# Patient Record
Sex: MALE | Race: OTHER | HISPANIC OR LATINO | URBAN - METROPOLITAN AREA
[De-identification: names, ages, dates, MRNs, and addresses within clinical notes are randomized per-mention and may not be internally consistent; named-entity substitution may affect disease eponyms.]

---

## 2024-04-08 ENCOUNTER — EMERGENCY (EMERGENCY)
Facility: HOSPITAL | Age: 49
LOS: 1 days | Discharge: ANOTHER HOSPITAL | End: 2024-04-08
Attending: EMERGENCY MEDICINE
Payer: COMMERCIAL

## 2024-04-08 VITALS
OXYGEN SATURATION: 97 % | RESPIRATION RATE: 18 BRPM | HEIGHT: 66.93 IN | HEART RATE: 83 BPM | TEMPERATURE: 99 F | DIASTOLIC BLOOD PRESSURE: 91 MMHG | SYSTOLIC BLOOD PRESSURE: 138 MMHG | WEIGHT: 205.03 LBS

## 2024-04-08 PROCEDURE — 99285 EMERGENCY DEPT VISIT HI MDM: CPT | Mod: 25

## 2024-04-08 PROCEDURE — 12011 RPR F/E/E/N/L/M 2.5 CM/<: CPT

## 2024-04-08 PROCEDURE — 70450 CT HEAD/BRAIN W/O DYE: CPT | Mod: 26,MC

## 2024-04-08 PROCEDURE — 70486 CT MAXILLOFACIAL W/O DYE: CPT | Mod: 26,MC

## 2024-04-08 RX ORDER — ACETAMINOPHEN 500 MG
650 TABLET ORAL ONCE
Refills: 0 | Status: COMPLETED | OUTPATIENT
Start: 2024-04-08 | End: 2024-04-08

## 2024-04-08 NOTE — ED ADULT TRIAGE NOTE - CHIEF COMPLAINT QUOTE
Pt stated he tripped and fell hitting is head and nose on the sidewalk, this occurred 1 1/2 hour ago, denies loc or being on blood thinners, laceration noted to forehead and abrasion noted to nose

## 2024-04-09 ENCOUNTER — EMERGENCY (EMERGENCY)
Facility: HOSPITAL | Age: 49
LOS: 1 days | Discharge: HOME | End: 2024-04-09
Attending: STUDENT IN AN ORGANIZED HEALTH CARE EDUCATION/TRAINING PROGRAM
Payer: COMMERCIAL

## 2024-04-09 VITALS
RESPIRATION RATE: 12 BRPM | OXYGEN SATURATION: 100 % | DIASTOLIC BLOOD PRESSURE: 76 MMHG | TEMPERATURE: 99 F | SYSTOLIC BLOOD PRESSURE: 107 MMHG | HEART RATE: 78 BPM

## 2024-04-09 VITALS
RESPIRATION RATE: 14 BRPM | SYSTOLIC BLOOD PRESSURE: 131 MMHG | DIASTOLIC BLOOD PRESSURE: 87 MMHG | HEART RATE: 78 BPM | TEMPERATURE: 97 F | OXYGEN SATURATION: 100 %

## 2024-04-09 LAB
ALBUMIN SERPL ELPH-MCNC: 4.2 G/DL — SIGNIFICANT CHANGE UP (ref 3.3–5)
ALP SERPL-CCNC: 71 U/L — SIGNIFICANT CHANGE UP (ref 40–120)
ALT FLD-CCNC: 25 U/L — SIGNIFICANT CHANGE UP (ref 10–45)
ANION GAP SERPL CALC-SCNC: 13 MMOL/L — SIGNIFICANT CHANGE UP (ref 5–17)
ANION GAP SERPL CALC-SCNC: 6 MMOL/L — SIGNIFICANT CHANGE UP (ref 5–17)
APPEARANCE UR: ABNORMAL
APTT BLD: 30.7 SEC — SIGNIFICANT CHANGE UP (ref 24.5–35.6)
APTT BLD: 31.5 SEC — SIGNIFICANT CHANGE UP (ref 24.5–35.6)
AST SERPL-CCNC: 22 U/L — SIGNIFICANT CHANGE UP (ref 10–40)
BACTERIA # UR AUTO: NEGATIVE /HPF — SIGNIFICANT CHANGE UP
BASOPHILS # BLD AUTO: 0.03 K/UL — SIGNIFICANT CHANGE UP (ref 0–0.2)
BASOPHILS # BLD AUTO: 0.05 K/UL — SIGNIFICANT CHANGE UP (ref 0–0.2)
BASOPHILS NFR BLD AUTO: 0.3 % — SIGNIFICANT CHANGE UP (ref 0–2)
BASOPHILS NFR BLD AUTO: 0.4 % — SIGNIFICANT CHANGE UP (ref 0–2)
BILIRUB SERPL-MCNC: 0.2 MG/DL — SIGNIFICANT CHANGE UP (ref 0.2–1.2)
BILIRUB UR-MCNC: NEGATIVE — SIGNIFICANT CHANGE UP
BUN SERPL-MCNC: 19 MG/DL — SIGNIFICANT CHANGE UP (ref 7–23)
BUN SERPL-MCNC: 20 MG/DL — HIGH (ref 7–18)
CALCIUM SERPL-MCNC: 8.7 MG/DL — SIGNIFICANT CHANGE UP (ref 8.4–10.5)
CALCIUM SERPL-MCNC: 8.8 MG/DL — SIGNIFICANT CHANGE UP (ref 8.4–10.5)
CAST: 0 /LPF — SIGNIFICANT CHANGE UP (ref 0–4)
CHLORIDE SERPL-SCNC: 108 MMOL/L — SIGNIFICANT CHANGE UP (ref 96–108)
CHLORIDE SERPL-SCNC: 110 MMOL/L — HIGH (ref 96–108)
CO2 SERPL-SCNC: 22 MMOL/L — SIGNIFICANT CHANGE UP (ref 22–31)
CO2 SERPL-SCNC: 26 MMOL/L — SIGNIFICANT CHANGE UP (ref 22–31)
COLOR SPEC: YELLOW — SIGNIFICANT CHANGE UP
CREAT SERPL-MCNC: 1.02 MG/DL — SIGNIFICANT CHANGE UP (ref 0.5–1.3)
CREAT SERPL-MCNC: 1.03 MG/DL — SIGNIFICANT CHANGE UP (ref 0.5–1.3)
DIFF PNL FLD: NEGATIVE — SIGNIFICANT CHANGE UP
EGFR: 88 ML/MIN/1.73M2 — SIGNIFICANT CHANGE UP
EGFR: 89 ML/MIN/1.73M2 — SIGNIFICANT CHANGE UP
EOSINOPHIL # BLD AUTO: 0.51 K/UL — HIGH (ref 0–0.5)
EOSINOPHIL # BLD AUTO: 0.57 K/UL — HIGH (ref 0–0.5)
EOSINOPHIL NFR BLD AUTO: 4.6 % — SIGNIFICANT CHANGE UP (ref 0–6)
EOSINOPHIL NFR BLD AUTO: 4.9 % — SIGNIFICANT CHANGE UP (ref 0–6)
ETHANOL SERPL-MCNC: <10 MG/DL — SIGNIFICANT CHANGE UP (ref 0–10)
GLUCOSE SERPL-MCNC: 120 MG/DL — HIGH (ref 70–99)
GLUCOSE SERPL-MCNC: 98 MG/DL — SIGNIFICANT CHANGE UP (ref 70–99)
GLUCOSE UR QL: NEGATIVE MG/DL — SIGNIFICANT CHANGE UP
HCT VFR BLD CALC: 40.9 % — SIGNIFICANT CHANGE UP (ref 39–50)
HCT VFR BLD CALC: 41.1 % — SIGNIFICANT CHANGE UP (ref 39–50)
HGB BLD-MCNC: 13.2 G/DL — SIGNIFICANT CHANGE UP (ref 13–17)
HGB BLD-MCNC: 13.3 G/DL — SIGNIFICANT CHANGE UP (ref 13–17)
IMM GRANULOCYTES NFR BLD AUTO: 0.4 % — SIGNIFICANT CHANGE UP (ref 0–0.9)
IMM GRANULOCYTES NFR BLD AUTO: 0.4 % — SIGNIFICANT CHANGE UP (ref 0–0.9)
INR BLD: 1 RATIO — SIGNIFICANT CHANGE UP (ref 0.85–1.18)
INR BLD: 1.06 RATIO — SIGNIFICANT CHANGE UP (ref 0.85–1.18)
KETONES UR-MCNC: NEGATIVE MG/DL — SIGNIFICANT CHANGE UP
LACTATE BLDV-MCNC: 1.1 MMOL/L — SIGNIFICANT CHANGE UP (ref 0.5–2)
LEUKOCYTE ESTERASE UR-ACNC: NEGATIVE — SIGNIFICANT CHANGE UP
LYMPHOCYTES # BLD AUTO: 2.83 K/UL — SIGNIFICANT CHANGE UP (ref 1–3.3)
LYMPHOCYTES # BLD AUTO: 25.4 % — SIGNIFICANT CHANGE UP (ref 13–44)
LYMPHOCYTES # BLD AUTO: 26 % — SIGNIFICANT CHANGE UP (ref 13–44)
LYMPHOCYTES # BLD AUTO: 3.01 K/UL — SIGNIFICANT CHANGE UP (ref 1–3.3)
MCHC RBC-ENTMCNC: 29.6 PG — SIGNIFICANT CHANGE UP (ref 27–34)
MCHC RBC-ENTMCNC: 29.7 PG — SIGNIFICANT CHANGE UP (ref 27–34)
MCHC RBC-ENTMCNC: 32.1 GM/DL — SIGNIFICANT CHANGE UP (ref 32–36)
MCHC RBC-ENTMCNC: 32.5 GM/DL — SIGNIFICANT CHANGE UP (ref 32–36)
MCV RBC AUTO: 90.9 FL — SIGNIFICANT CHANGE UP (ref 80–100)
MCV RBC AUTO: 92.4 FL — SIGNIFICANT CHANGE UP (ref 80–100)
MONOCYTES # BLD AUTO: 0.98 K/UL — HIGH (ref 0–0.9)
MONOCYTES # BLD AUTO: 1.24 K/UL — HIGH (ref 0–0.9)
MONOCYTES NFR BLD AUTO: 10.7 % — SIGNIFICANT CHANGE UP (ref 2–14)
MONOCYTES NFR BLD AUTO: 8.8 % — SIGNIFICANT CHANGE UP (ref 2–14)
NEUTROPHILS # BLD AUTO: 6.66 K/UL — SIGNIFICANT CHANGE UP (ref 1.8–7.4)
NEUTROPHILS # BLD AUTO: 6.75 K/UL — SIGNIFICANT CHANGE UP (ref 1.8–7.4)
NEUTROPHILS NFR BLD AUTO: 57.7 % — SIGNIFICANT CHANGE UP (ref 43–77)
NEUTROPHILS NFR BLD AUTO: 60.4 % — SIGNIFICANT CHANGE UP (ref 43–77)
NITRITE UR-MCNC: NEGATIVE — SIGNIFICANT CHANGE UP
NRBC # BLD: 0 /100 WBCS — SIGNIFICANT CHANGE UP (ref 0–0)
NRBC # BLD: 0 /100 WBCS — SIGNIFICANT CHANGE UP (ref 0–0)
PH UR: 7 — SIGNIFICANT CHANGE UP (ref 5–8)
PLATELET # BLD AUTO: 291 K/UL — SIGNIFICANT CHANGE UP (ref 150–400)
PLATELET # BLD AUTO: 294 K/UL — SIGNIFICANT CHANGE UP (ref 150–400)
POTASSIUM SERPL-MCNC: 3.8 MMOL/L — SIGNIFICANT CHANGE UP (ref 3.5–5.3)
POTASSIUM SERPL-MCNC: 3.9 MMOL/L — SIGNIFICANT CHANGE UP (ref 3.5–5.3)
POTASSIUM SERPL-SCNC: 3.8 MMOL/L — SIGNIFICANT CHANGE UP (ref 3.5–5.3)
POTASSIUM SERPL-SCNC: 3.9 MMOL/L — SIGNIFICANT CHANGE UP (ref 3.5–5.3)
PROT SERPL-MCNC: 7.3 G/DL — SIGNIFICANT CHANGE UP (ref 6–8.3)
PROT UR-MCNC: NEGATIVE MG/DL — SIGNIFICANT CHANGE UP
PROTHROM AB SERPL-ACNC: 11.1 SEC — SIGNIFICANT CHANGE UP (ref 9.5–13)
PROTHROM AB SERPL-ACNC: 11.4 SEC — SIGNIFICANT CHANGE UP (ref 9.5–13)
RBC # BLD: 4.45 M/UL — SIGNIFICANT CHANGE UP (ref 4.2–5.8)
RBC # BLD: 4.5 M/UL — SIGNIFICANT CHANGE UP (ref 4.2–5.8)
RBC # FLD: 13.8 % — SIGNIFICANT CHANGE UP (ref 10.3–14.5)
RBC # FLD: 13.9 % — SIGNIFICANT CHANGE UP (ref 10.3–14.5)
RBC CASTS # UR COMP ASSIST: 0 /HPF — SIGNIFICANT CHANGE UP (ref 0–4)
SODIUM SERPL-SCNC: 142 MMOL/L — SIGNIFICANT CHANGE UP (ref 135–145)
SODIUM SERPL-SCNC: 143 MMOL/L — SIGNIFICANT CHANGE UP (ref 135–145)
SP GR SPEC: 1.02 — SIGNIFICANT CHANGE UP (ref 1–1.03)
SQUAMOUS # UR AUTO: 0 /HPF — SIGNIFICANT CHANGE UP (ref 0–5)
UROBILINOGEN FLD QL: 1 MG/DL — SIGNIFICANT CHANGE UP (ref 0.2–1)
WBC # BLD: 11.16 K/UL — HIGH (ref 3.8–10.5)
WBC # BLD: 11.56 K/UL — HIGH (ref 3.8–10.5)
WBC # FLD AUTO: 11.16 K/UL — HIGH (ref 3.8–10.5)
WBC # FLD AUTO: 11.56 K/UL — HIGH (ref 3.8–10.5)
WBC UR QL: 0 /HPF — SIGNIFICANT CHANGE UP (ref 0–5)

## 2024-04-09 PROCEDURE — 36415 COLL VENOUS BLD VENIPUNCTURE: CPT

## 2024-04-09 PROCEDURE — 80048 BASIC METABOLIC PNL TOTAL CA: CPT

## 2024-04-09 PROCEDURE — 70450 CT HEAD/BRAIN W/O DYE: CPT | Mod: MC

## 2024-04-09 PROCEDURE — 85730 THROMBOPLASTIN TIME PARTIAL: CPT

## 2024-04-09 PROCEDURE — 70486 CT MAXILLOFACIAL W/O DYE: CPT | Mod: MC

## 2024-04-09 PROCEDURE — 85610 PROTHROMBIN TIME: CPT

## 2024-04-09 PROCEDURE — 83605 ASSAY OF LACTIC ACID: CPT

## 2024-04-09 PROCEDURE — 99285 EMERGENCY DEPT VISIT HI MDM: CPT | Mod: 25

## 2024-04-09 PROCEDURE — 90471 IMMUNIZATION ADMIN: CPT

## 2024-04-09 PROCEDURE — 70450 CT HEAD/BRAIN W/O DYE: CPT | Mod: 26,MC

## 2024-04-09 PROCEDURE — 71045 X-RAY EXAM CHEST 1 VIEW: CPT | Mod: 26,77

## 2024-04-09 PROCEDURE — 99284 EMERGENCY DEPT VISIT MOD MDM: CPT | Mod: 25

## 2024-04-09 PROCEDURE — 72125 CT NECK SPINE W/O DYE: CPT | Mod: MC

## 2024-04-09 PROCEDURE — 99285 EMERGENCY DEPT VISIT HI MDM: CPT

## 2024-04-09 PROCEDURE — 90715 TDAP VACCINE 7 YRS/> IM: CPT

## 2024-04-09 PROCEDURE — 80307 DRUG TEST PRSMV CHEM ANLYZR: CPT

## 2024-04-09 PROCEDURE — 72125 CT NECK SPINE W/O DYE: CPT | Mod: 26,MC

## 2024-04-09 PROCEDURE — 12011 RPR F/E/E/N/L/M 2.5 CM/<: CPT

## 2024-04-09 PROCEDURE — 72170 X-RAY EXAM OF PELVIS: CPT | Mod: 26

## 2024-04-09 PROCEDURE — 85025 COMPLETE CBC W/AUTO DIFF WBC: CPT

## 2024-04-09 PROCEDURE — 99282 EMERGENCY DEPT VISIT SF MDM: CPT | Mod: GC

## 2024-04-09 PROCEDURE — 71045 X-RAY EXAM CHEST 1 VIEW: CPT | Mod: 26

## 2024-04-09 PROCEDURE — 81001 URINALYSIS AUTO W/SCOPE: CPT

## 2024-04-09 PROCEDURE — 72170 X-RAY EXAM OF PELVIS: CPT

## 2024-04-09 PROCEDURE — 71045 X-RAY EXAM CHEST 1 VIEW: CPT

## 2024-04-09 PROCEDURE — 80053 COMPREHEN METABOLIC PANEL: CPT

## 2024-04-09 RX ORDER — SODIUM CHLORIDE 9 MG/ML
250 INJECTION INTRAMUSCULAR; INTRAVENOUS; SUBCUTANEOUS ONCE
Refills: 0 | Status: COMPLETED | OUTPATIENT
Start: 2024-04-09 | End: 2024-04-09

## 2024-04-09 RX ORDER — TETANUS TOXOID, REDUCED DIPHTHERIA TOXOID AND ACELLULAR PERTUSSIS VACCINE, ADSORBED 5; 2.5; 8; 8; 2.5 [IU]/.5ML; [IU]/.5ML; UG/.5ML; UG/.5ML; UG/.5ML
0.5 SUSPENSION INTRAMUSCULAR ONCE
Refills: 0 | Status: COMPLETED | OUTPATIENT
Start: 2024-04-09 | End: 2024-04-09

## 2024-04-09 RX ADMIN — SODIUM CHLORIDE 250 MILLILITER(S): 9 INJECTION INTRAMUSCULAR; INTRAVENOUS; SUBCUTANEOUS at 03:01

## 2024-04-09 RX ADMIN — Medication 650 MILLIGRAM(S): at 01:39

## 2024-04-09 RX ADMIN — TETANUS TOXOID, REDUCED DIPHTHERIA TOXOID AND ACELLULAR PERTUSSIS VACCINE, ADSORBED 0.5 MILLILITER(S): 5; 2.5; 8; 8; 2.5 SUSPENSION INTRAMUSCULAR at 01:53

## 2024-04-09 NOTE — ED PROVIDER NOTE - NSFOLLOWUPINSTRUCTIONS_ED_ALL_ED_FT
You were seen in the Emergency Department for multiple facial fractures.     1) Advance activity as tolerated.   2) Continue all previously prescribed medications as directed.    3) Follow up with plastic surgery, neurosurgery, and your primary care physician in 1 week - take copies of your results.    4) Return to the Emergency Department for worsening or persistent symptoms, and/or ANY NEW OR CONCERNING SYMPTOMS.     Fracture    A fracture is a break in one of your bones. This can occur from a variety of injuries, especially traumatic ones. Symptoms include pain, bruising, or swelling. Do not use the injured limb. If a fracture is in one of the bones below your waist, do not put weight on that limb unless instructed to do so by your healthcare provider. Crutches or a cane may have been provided. A splint or cast may have been applied by your health care provider. Make sure to keep it dry and follow up with an orthopedist as instructed.    SEEK IMMEDIATE MEDICAL CARE IF YOU HAVE ANY OF THE FOLLOWING SYMPTOMS: numbness, tingling, increasing pain, or weakness in any part of the injured limb.     Sinus precautions are specific guidelines recommended to patients who have had a fracture or surgery involving the sinuses, particularly the maxillary sinuses, which are located in the cheek area. These precautions are aimed at promoting healing and preventing complications, such as infection or disruption of the surgical site. They may include, but are not limited to:    Avoiding Blowing Your Nose: Blowing your nose can increase pressure in the sinus cavities, potentially disrupting healing tissues or causing air to become trapped in the surrounding spaces.    Sneezing with Your Mouth Open: If you need to sneeze, you should try to sneeze with your mouth open to reduce pressure in the nasal and sinus cavities.    No Straws: Avoid using straws for drinking, as the suction created can also increase sinus pressure.     Avoiding Smoking and Secondhand Smoke: Smoking can delay healing and increase the risk of infection.   Limiting Air Travel: Flying can cause changes in air pressure that may affect the sinuses, so it's often recommended to avoid air travel for a period after surgery or until your doctor advises it's safe.    Avoiding Strenuous Activities: Activities that significantly increase blood pressure, including heavy lifting or intense exercise, should be avoided as they can lead to increased bleeding or swelling in the sinus area.    Keeping the Mouth Open During Yawning and Laughing: Similar to sneezing, keeping the mouth open can help reduce pressure in the sinuses.    Following these precautions closely can help ensure a smoother recovery and minimize the risk of complications. If you've had a sinus fracture or surgery, it's important to follow up with your healthcare provider and adhere to their guidance and recommendations. Lo atendieron en el Departamento de Emergencias por múltiples fracturas faciales.    1) Avanzar en la actividad según se tolere.  2) Continúe con todos los medicamentos recetados anteriormente según las indicaciones.  3) Pablo un seguimiento con cirugía plástica, neurocirugía y holloway médico de atención primaria en 1 semana; tome copias de sammy resultados.  4) Regresar al Departamento de Emergencias si los síntomas empeoran o persisten, y/o CUALQUIER SÍNTOMA NUEVO O PREOCUPANTE.    Fractura    Dwight fractura es dwight rotura en tahmina de sammy huesos. [place] puede ocurrir por dwight variedad de lesiones, especialmente las traumáticas. Los síntomas incluyen dolor, hematomas o hinchazón. No utilice la extremidad lesionada. Si hay dwight fractura en tahmina de los huesos debajo de la cintura, no ponga peso sobre tete extremidad a menos que se lo indique holloway proveedor de atención médica. Es posible que se hayan proporcionado muletas o un bastón. Es posible que holloway proveedor de atención médica le haya colocado dwight férula o un yeso. Asegúrese de mantenerlo seco y consulte con un ortopedista según las instrucciones.    BUSQUE ATENCIÓN MÉDICA INMEDIATA SI TIENE ALGUNO DE LOS SIGUIENTES SÍNTOMAS: entumecimiento, hormigueo, aumento del dolor o debilidad en cualquier parte de la extremidad lesionada.      Las precauciones sinusales son pautas específicas recomendadas a pacientes que roe tenido dwight fractura o cirugía que involucra los senos nasales, particularmente los senos maxilares, que se encuentran en el área de las mejillas. Estas precauciones tienen scotty objetivo promover la curación y prevenir complicaciones, scotty infección o alteración del sitio quirúrgico. Pueden incluir, entre otros:    Evitar sonarse la nariz: Sonarse la nariz puede aumentar la presión en las cavidades sinusales, lo que podría alterar los tejidos en curación o hacer que el aire quede atrapado en los espacios circundantes.    Estornudar con la boca abierta: si necesita estornudar, debe intentar estornudar con la boca abierta para reducir la presión en las cavidades nasales y sinusales.    Sin pajitas: Evite el uso de pajitas para beber, ya que la succión creada también puede aumentar la presión en los senos nasales.    Evitar fumar y el humo de segunda mano: Fumar puede retrasar la curación y aumentar el riesgo de infección.  Limitar los viajes en avión: volar puede provocar cambios en la presión del aire que pueden afectar los senos nasales, por lo que a menudo se recomienda evitar los viajes en avión sunny un período después de la cirugía o hasta que holloway médico le indique que es seguro.    Evitar actividades extenuantes: Se deben evitar las actividades que aumentan significativamente la presión arterial, incluido levantar objetos pesados ??o hacer ejercicio intenso, ya que pueden provocar un aumento del sangrado o la hinchazón en el área de los senos nasales.    Mantener la boca abierta al bostezar y reír: similar a estornudar, mantener la boca abierta puede ayudar a reducir la presión en los senos nasales.    Seguir estrictamente estas precauciones puede ayudar a garantizar dwight recuperación más fluida y minimizar el riesgo de complicaciones. Si ha tenido dwight fractura de seno nasal o dwight cirugía, es importante que pablo un seguimiento con holloway proveedor de atención médica y cumpla con sammy indicaciones y recomendaciones.    -----      You were seen in the Emergency Department for multiple facial fractures.     1) Advance activity as tolerated.   2) Continue all previously prescribed medications as directed.    3) Follow up with plastic surgery, neurosurgery, and your primary care physician in 1 week - take copies of your results.    4) Return to the Emergency Department for worsening or persistent symptoms, and/or ANY NEW OR CONCERNING SYMPTOMS.     Fracture    A fracture is a break in one of your bones. This can occur from a variety of injuries, especially traumatic ones. Symptoms include pain, bruising, or swelling. Do not use the injured limb. If a fracture is in one of the bones below your waist, do not put weight on that limb unless instructed to do so by your healthcare provider. Crutches or a cane may have been provided. A splint or cast may have been applied by your health care provider. Make sure to keep it dry and follow up with an orthopedist as instructed.    SEEK IMMEDIATE MEDICAL CARE IF YOU HAVE ANY OF THE FOLLOWING SYMPTOMS: numbness, tingling, increasing pain, or weakness in any part of the injured limb.     Sinus precautions are specific guidelines recommended to patients who have had a fracture or surgery involving the sinuses, particularly the maxillary sinuses, which are located in the cheek area. These precautions are aimed at promoting healing and preventing complications, such as infection or disruption of the surgical site. They may include, but are not limited to:    Avoiding Blowing Your Nose: Blowing your nose can increase pressure in the sinus cavities, potentially disrupting healing tissues or causing air to become trapped in the surrounding spaces.    Sneezing with Your Mouth Open: If you need to sneeze, you should try to sneeze with your mouth open to reduce pressure in the nasal and sinus cavities.    No Straws: Avoid using straws for drinking, as the suction created can also increase sinus pressure.     Avoiding Smoking and Secondhand Smoke: Smoking can delay healing and increase the risk of infection.   Limiting Air Travel: Flying can cause changes in air pressure that may affect the sinuses, so it's often recommended to avoid air travel for a period after surgery or until your doctor advises it's safe.    Avoiding Strenuous Activities: Activities that significantly increase blood pressure, including heavy lifting or intense exercise, should be avoided as they can lead to increased bleeding or swelling in the sinus area.    Keeping the Mouth Open During Yawning and Laughing: Similar to sneezing, keeping the mouth open can help reduce pressure in the sinuses.    Following these precautions closely can help ensure a smoother recovery and minimize the risk of complications. If you've had a sinus fracture or surgery, it's important to follow up with your healthcare provider and adhere to their guidance and recommendations.

## 2024-04-09 NOTE — ED PROVIDER NOTE - CARE PLAN
Principal Discharge DX:	Skull fracture with cerebral contusion  Secondary Diagnosis:	Facial fracture   1

## 2024-04-09 NOTE — CONSULT NOTE ADULT - ASSESSMENT
49M no PMHx p/f mech fall. xfer for CT max face w/mult fx and CTH w/? L fronto medial contusion vs artifact. Denied AC/AP, plt/coags wnl  Exam: Kiswahili speaking, intact   -4h interval CTH   -No acute neurosurgery intervention  -Hold AC/AP, if adm and DVT ppx is otherwise indicated can obtain long interval CTH (24h) and if read as stable start ppx dosing chemoppx 24h thereafter

## 2024-04-09 NOTE — ED ADULT NURSE NOTE - OBJECTIVE STATEMENT
As per pt. c/o face planting while walking down the sidewalk. Denies LOC, syncope or any other causing factor.

## 2024-04-09 NOTE — ED PROVIDER NOTE - PROGRESS NOTE DETAILS
Neurosurgery, plastic surgery, trauma surgery evaluated patient. With repeat CT results recommended discharge with follow up outpatient. Improvement on symptoms. Passed PO challenge. Spoke to patient/family about results including incidental findings. Plan to discharge patient. Patient given NSURG, Plastics, PCP follow up and return and sinus precautions. Patient/family agrees with plan.

## 2024-04-09 NOTE — ED PROVIDER NOTE - CARE PROVIDER_API CALL
Temo Tipton  Neurosurgery  [address], [address]  [place] [address]  Phone: [phone]  Fax: [phone]  Established Patient  Follow Up Time: 4-6 Days    Ge Jimenez  Plastic Surgery  [address] [place] [address], [address]  [place] [address]  Phone: [phone]  Fax: [phone]  Established Patient  Follow Up Time: 4-6 Days

## 2024-04-09 NOTE — ED ADULT NURSE NOTE - NSFALLRISKINTERV_ED_ALL_ED

## 2024-04-09 NOTE — CHART NOTE - NSCHARTNOTEFT_GEN_A_CORE
CTH reviewed, pend final read, hyperdensity previously visualized no longer present, likely represented artifact, can follow up on outpatient basis with Dr. Tipton in 1-2 weeks

## 2024-04-09 NOTE — CONSULT NOTE ADULT - ASSESSMENT
49M no medical or surgical hx tx [facility] presenting s/p mechanical fall with OSH suero cf facial fractures and possible brain bleed.    Recommendations:  -NPO/IVF pending eval by plastic surgery/OMFS for facial fractures.  -Repeat head CT at 5am.  -Pain control.  -Patient already received tetanus vaccine at [facility].  -Dispo pending facial fx eval and NSGY recs following repeat CTH.    Discussed with Dr. Robert.    ACS/Trauma Surgery  Please page [phone] for all questions.   49M no medical or surgical hx tx [facility] presenting s/p mechanical fall with OSH suero cf facial fractures and possible brain bleed.    Recommendations:  -NPO/IVF pending eval by plastic surgery/OMFS for facial fractures-> rec o/p f/u-> advance diet as tolerated.  -Repeat head CT at 5am-> per NSGY, original CTH likely with artifact. NSGY rec o/p f/u.  -Pain control.  -Patient already received tetanus vaccine at [facility].  -Dispo pending facial fx eval and NSGY recs following repeat CTH-> as per NSGY, patient can f/u op. No trauma surgery contraindication to discharge with appropriate follow-up scheduled with NSGY and PRS. Can f/u with trauma surgery as needed.    Discussed with Dr. Robert.    ACS/Trauma Surgery  Please page [phone] for all questions.

## 2024-04-09 NOTE — ED PROVIDER NOTE - CLINICAL SUMMARY MEDICAL DECISION MAKING FREE TEXT BOX
CT HEAD: Questionable hemorrhagic contusion involving the inferomedial right frontal lobe, with subtle subarachnoid hemorrhage suspected. No evidence of intracranial mass effect. Repeat head CT in 4 to 6 hours is recommended to assess stability.  Frontal calvarial fractures identified extending through the frontal sinuses.  CT MAXILLOFACIAL:  Multiple facial fractures identified, involving the bilateral nasal bones/nasal septum, anterior ethmoid bone, and bilateral frontal sinuses extending to the superior orbital rims.  No evidence of direct injury to the globe or evidence of retrobulbar hematoma.    130a-   Case discussed with trauma surgeon Dr. Robert at [facility], will accept patient for evaluation.  Patient no acute distress and agrees with transfer.   I had a detailed discussion with the patient and/or guardian regarding the historical points, exam findings, and any diagnostic results supporting the transfer diagnosis.

## 2024-04-09 NOTE — ED PROVIDER NOTE - ATTENDING CONTRIBUTION TO CARE
Attending STONEY Mohamud I performed a history and physical exam of the patient and discussed their management with the resident. I reviewed the resident's note and agree with the documented findings and plan of care, except as noted. My medical decision making and observations are as follows:    49 M with no PMH transferred from [facility] for trauma evaluation.  Patient originally presented to sending facility after reportedly mechanical fall around 7 PM, with strike to head/face without LOC.  On EMR review of alternate MRN [id number], CT head/maxillofacial performed at 2347 showed     "Questionable hemorrhagic contusion involving the inferomedial right frontal lobe, with subtle subarachnoid hemorrhage suspected. No evidence of intracranial mass effect. Repeat head CT in 4 to 6 hours is recommended to assess stability. Frontal calvarial fractures identified extending through the frontal sinuses. Multiple facial fractures identified, involving the bilateral nasal bones/nasal septum, anterior ethmoid bone, and bilateral frontal sinuses extending to the superior orbital rims."      Received TDap and tylenol prior to transfer. Vital signs stable en route; on exam, patient in no acute distress, normal work of breathing, speaking full sentences. ABCs intact. No raccoon sign or castellano sign.  C-collar in place.  Edema and tenderness to palpation over forehead and nasal bridge, no nasal septal hematoma. Forehead laceration s/p   No evidence of trauma or tenderness to palpation of scalp, C/T/L spine, chest wall, abdomen. Pelvis stable. 5/5 strength, intact sensation, intact ROM, 2+ pulses x4 extremities.    MDM–vital signs stable.  Trauma surgery resident at bedside during initial evaluation, recommending repeat CT head at 5 AM as well as neurosurgery and facial surgery consultation.  Will send trauma labs and obtain CT C-spine in addition to interval repeat CT head.    NSG evaluated patient, agree with plan for interval repeat imaging. Do not recommend Keppra at this time.

## 2024-04-09 NOTE — CONSULT NOTE ADULT - ASSESSMENT
49M with no medical or surgical hx, tx from [facility] and presenting s/p mechanical fall. Patient reports he was walking outside and tripped on the sidewalk. Hit his head. Denies dizziness or feeling ill before fall. Reports nasal pain. Denies headache, vision or other sensory changes. No other complaints. No recent illnesses. No LOC.    On imaging with nasal bone/septal fractures and anterior table frontal sinus fractures. No evidence of posterior table involvement clinically or obstruction of frontonasal ducts.    - recommend outpatient follow up for consideration of closed nasal reduction, possible consideration of anterior table recon versus delayed fat grafting in light of very minimal depression on imaging  - return precautions reviewed 49M with no medical or surgical hx, tx from [facility] and presenting s/p mechanical fall. Patient reports he was walking outside and tripped on the sidewalk. Hit his head. Denies dizziness or feeling ill before fall. Reports nasal pain. Denies headache, vision or other sensory changes. No other complaints. No recent illnesses. No LOC.    On imaging with nasal bone/septal fractures and anterior table frontal sinus fractures. No evidence of posterior table involvement clinically or obstruction of frontonasal ducts.    - recommend outpatient follow up for consideration of closed nasal reduction, possible consideration of anterior table recon versus delayed fat grafting in light of very minimal depression on imaging  - return precautions reviewed  - follow up within 1 week with Dr. Ge Jimenez  - sinus precautions

## 2024-04-09 NOTE — ED PROVIDER NOTE - OBJECTIVE STATEMENT
Manage  number#[id number].  [id number] 1-year-old male with no past medical history, not on any medications, not on any anticoagulants or antiplatelet agents, patient states at 7 PM he mechanically tripped on sidewalk striking his face and head on cement pavement.  Patient denies LOC.  Patient noted to have small  approximately 0.5 cm linear laceration to mid forehead area.  Patient states had initial bilateral epistaxis after injury.  On evaluation patient is alert and orient x 3, denies any extremity injuries, no chest pain, no shortness of breath, no abdominal pain.  No neck pain. Patient denies any alcohol or illicit drug use.

## 2024-04-09 NOTE — ED ADULT NURSE NOTE - OBJECTIVE STATEMENT
Pt is trauma transfer from [place]. As per EMS, pt was walking when tripped and fell onto face. Pt denies LOC and AC use. Pt denies pertinent PMH or daily medication use. Pt arrived and maintained in C-collar. Pt transferred to [facility] for trauma transfer. As per EMS, concern for subarachnoid, repeat CT to be done @ 5am. CT also showed multiple facial fractures. Upon assessment, pt AxOx3, sitting up in stretcher and speaking in full sentences, in c-collar. Breathing spontaneously and unlabored, >95% RA. Abdomen soft and nontender to palpation. Pt placed on continuous cardiac monitor, NSR. Pt moves all extremities w/ = strength. Skin is warm, dry, and intact w/ + peripheral pulses. Pt denies SOB, chest pain, n/v/d, dizziness, vision changes, chills, and fever. Safety and comfort measures provided- bed in lowest position, locked, and blanket given.

## 2024-04-09 NOTE — ED PROVIDER NOTE - PHYSICAL EXAMINATION
No distress  GCS 15, no raccoon eyes, no Battles sign, no scalp step off deformities.   Extra ocular muscles intact, pupils 3mm and reactive to light, no photophobia, no pain with eye movement, no hyphema   No cervical, thoracic or lumbosacral midline bony deformities,  +rotation and flexion-extension of neck and truncal area intact.   No active epistaxis, no visible septal hematoma, no [place] fracture, no dental trauma.  No cervical, thoracic or lumbosacral midline bony deformities,  +rotation and flexion-extension of neck and truncal area intact.   No extremity bony deformities  No guarding full palpation of abdomen.

## 2024-04-09 NOTE — ED ADULT NURSE REASSESSMENT NOTE - NS ED NURSE REASSESS COMMENT FT1
Assumed care of patient from night RN Kerline. Pt resting comfortably in stretcher.  #[id number] used for communication with patient. CT scan negative. Awaiting dispo. Safety and comfort measures maintained.

## 2024-04-09 NOTE — ED PROVIDER NOTE - PATIENT PORTAL LINK FT
You can access the FollowMyHealth Patient Portal offered by [facility] by registering at the following website: [email or web address]. By joining [facility]’s FollowMyHealth portal, you will also be able to view your health information using other applications (apps) compatible with our system.

## 2024-04-09 NOTE — CONSULT NOTE ADULT - ATTENDING COMMENTS
Patient examined by me in ED at approximately 0530.  49 year old man s/p mechanical fall from standing.   Transferred from [facility].     Vitals stable.   GCS 15. No deficits.   Multiple areas with facial edema and mild ecchymosis.   No significant evidence of trauma over trunk or extremities.     Labs grossly unremarkable.     Initial CTH at [facility] concerning for possible hemorrhagic contusion/SAH. Repeat here at [facility] negative.   -Nasal bone/septal fractures and anterior table frontal sinus fractures.  CT C-spine negative.     A/P:  -Seen by plastic. Plan for outpatient follow up for facial fx.   -No intervention by trauma at this time.   -Dispo per ED.

## 2024-04-09 NOTE — ED PROVIDER NOTE - PROVIDER TOKENS
PROVIDER:[TOKEN:[[id number]],FOLLOWUP:[4-6 Days],ESTABLISHEDPATIENT:[T]],PROVIDER:[TOKEN:[[id number]],FOLLOWUP:[4-6 Days],ESTABLISHEDPATIENT:[T]]

## 2024-04-09 NOTE — CONSULT NOTE ADULT - SUBJECTIVE AND OBJECTIVE BOX
p (1480)     HPI: 49M no PMHx p/f mech fall. xfer for CT max face w/mult fx and CTH w/? L fronto medial contusion vs artifact. Denied AC/AP, plt/coags wnl  Exam: Eritrean speaking, intact     --Anticoagulation: denied    =====================  PAST MEDICAL HISTORY     PAST SURGICAL HISTORY     No Known Allergies      MEDICATIONS:  Antibiotics:    Neuro:    Other:      SOCIAL HISTORY:   Occupation:   Marital Status:     FAMILY HISTORY:      ROS: Negative except per HPI    LABS:  PT/INR - ( 09 Apr 2024 03:02 )   PT: 11.1 sec;   INR: 1.06 ratio         PTT - ( 09 Apr 2024 03:02 )  PTT:30.7 sec                        13.3   11.56 )-----------( 291      ( 09 Apr 2024 03:02 )             40.9     04-09    143  |  108  |  19  ----------------------------<  98  3.9   |  22  |  1.02    Ca    8.8      09 Apr 2024 03:02    TPro  7.3  /  Alb  4.2  /  TBili  0.2  /  DBili  x   /  AST  22  /  ALT  25  /  AlkPhos  71  04-09      
  TRAUMA SERVICE (Acute Care Surgery / [facility] - #9071) - CONSULT NOTE  --------------------------------------------------------------------------------------------    TRAUMA ACTIVATION LEVEL:     MECHANISM OF INJURY:      [] Blunt  	[] MVC	[X] Fall	[] Pedestrian Struck	[] Motorcycle accident      [] Penetrating  	[] Gun Shot Wound 		[] Stab Wound    GCS: 	E: 4	V: 5	M: 6      HPI:   49M no medical or surgical hx tx [facility] presenting s/p mechanical fall. Patient reports he was walking outside and tripped on the sidewalk. Denies dizziness or feeling ill before fall. Reports nasal pain. Denies headache, vision or other sensory changes. No other complaints. No recent illnesses.    In ED, vss. W/u at [facility] cf facial fractures and possible brain bleed.      ***    Primary Survey:  ***  A - airway intact  B - bilateral breath sounds and good chest rise  C - initial BP: 131/87 (04-09-24 @ 02:31) , HR: 78 (04-09-24 @ 02:31) , palpable pulses in all extremities  D - GCS 15 on arrival  Exposure obtained      Secondary Survey: ***  General: NAD  HEENT: abrasions over forehead between eyes and on nose, dried blood in nares, nose tenderness, no other facial tenderness, no blood or CSF from ears, PERRLA  Neck: Soft, midline trachea, C-collar in place  Chest: No chest wall tenderness   Cardiac: pulse present  Respiratory: nonlabored respirations on room air  Abdomen: Soft, non-distended, non-tender, no rebound, no guarding, no masses palpated  Pelvis: Stable, non-tender, no ecchymosis  Ext: motor and sensory grossly intact in all extremities  Back: no TTP, no palpable runoff/stepoff/deformity    Patient denies fevers/chills, denies lightheadedness/dizziness, denies SOB/chest pain, denies nausea/vomiting, denies constipation/diarrhea.  ***    ROS: 10-system review is otherwise negative except HPI above.      PAST MEDICAL & SURGICAL HISTORY:    FAMILY HISTORY:    [] No history of bleeding or clotting disorders    SOCIAL HISTORY:  ***    ALLERGIES: No Known Allergies      HOME MEDICATIONS:     Nil    CURRENT MEDICATIONS  MEDICATIONS (STANDING): sodium chloride 0.9% Bolus 250 milliLiter(s) IV Bolus once    MEDICATIONS (PRN):  --------------------------------------------------------------------------------------------    Vitals:   T(C): 36.3 (04-09-24 @ 02:31), Max: 36.3 (04-09-24 @ 02:31)  HR: 78 (04-09-24 @ 02:31) (78 - 78)  BP: 131/87 (04-09-24 @ 02:31) (131/87 - 131/87)  RR: 14 (04-09-24 @ 02:31) (14 - 14)  SpO2: 100% (04-09-24 @ 02:31) (100% - 100%)  CAPILLARY BLOOD GLUCOSE        CAPILLARY BLOOD GLUCOSE            --------------------------------------------------------------------------------------------    LABS                --------------------------------------------------------------------------------------------    MICROBIOLOGY      --------------------------------------------------------------------------------------------    IMAGING  
PLASTIC SURGERY CONSULT NOTE    Patient is a 49y old male who presents with a chief complaint of facial fractures (2024 03:46)      HPI:  49M with no medical or surgical hx, tx from [facility] and presenting s/p mechanical fall. Patient reports he was walking outside and tripped on the sidewalk. Hit his head. Denies dizziness or feeling ill before fall. Reports nasal pain. Denies headache, vision or other sensory changes. No other complaints. No recent illnesses. No LOC.      PAST MEDICAL & SURGICAL HISTORY:    [ x ] No significant past history as reviewed with the patient and family    FAMILY HISTORY:    [ x ] Family history not pertinent as reviewed with the patient and family    SOCIAL HISTORY: NC    MEDICATIONS  (STANDING): per chart    Allergies    No Known Allergies      Vital Signs Last 24 Hrs  T(C): 36.3 (2024 02:31), Max: 36.3 (2024 02:31)  T(F): 97.3 (2024 02:31), Max: 97.3 (2024 02:31)  HR: 73 (2024 06:07) (71 - 78)  BP: 110/77 (2024 06:07) (109/75 - 131/87)  BP(mean): 80 (2024 04:15) (80 - 100)  RR: 17 (2024 06:07) (14 - 20)  SpO2: 98% (2024 06:07) (98% - 100%)    Parameters below as of 2024 06:07  Patient On (Oxygen Delivery Method): room air      Exam:    Neuro: Alert  GA: well-appearing  Pulm: breathing comfortably  GI: non-distended  HEENT: small abrasion in mid forehead with swelling, small abrasion across the bridge of the nose < 1cm, nasal deformity with slight deviation of the nasal pyramid, no septal hematoma, no obvious forehead depression, no malocclusion, trigeminal sensation intact, facial movements symmetric, no csf rhinorrhea                         13.3   11.56 )-----------( 291      ( 2024 03:02 )             40.9     04-09    143  |  108  |  19  ----------------------------<  98  3.9   |  22  |  1.02    Ca    8.8      2024 03:02    TPro  7.3  /  Alb  4.2  /  TBili  0.2  /  DBili  x   /  AST  22  /  ALT  25  /  AlkPhos  71  04-09    PT/INR - ( 2024 03:02 )   PT: 11.1 sec;   INR: 1.06 ratio         PTT - ( 2024 03:02 )  PTT:30.7 sec  Urinalysis Basic - ( 2024 04:59 )    Color: Yellow / Appearance: Cloudy / S.017 / pH: x  Gluc: x / Ketone: Negative mg/dL  / Bili: Negative / Urobili: 1.0 mg/dL   Blood: x / Protein: Negative mg/dL / Nitrite: Negative   Leuk Esterase: Negative / RBC: 0 /HPF / WBC 0 /HPF   Sq Epi: x / Non Sq Epi: 0 /HPF / Bacteria: Negative /HPF        IMAGING STUDIES:  < from: CT Head No Cont (24 @ 05:19) >  Redemonstrated acute fractures of the bilateral nasal bones, anterior   ethmoid bone, and bilateral frontal sinuses.    < end of copied text >    Reviewed the CT max face from [facility], as above

## 2024-05-09 PROBLEM — Z78.9 OTHER SPECIFIED HEALTH STATUS: Chronic | Status: ACTIVE | Noted: 2024-04-09

## 2024-05-09 PROBLEM — Z00.00 ENCOUNTER FOR PREVENTIVE HEALTH EXAMINATION: Status: ACTIVE | Noted: 2024-05-09

## 2024-05-14 ENCOUNTER — APPOINTMENT (OUTPATIENT)
Dept: PLASTIC SURGERY | Facility: CLINIC | Age: 49
End: 2024-05-14
Payer: COMMERCIAL

## 2024-05-14 VITALS — WEIGHT: 205 LBS | BODY MASS INDEX: 40.25 KG/M2 | HEIGHT: 60 IN | TEMPERATURE: 98 F

## 2024-05-14 DIAGNOSIS — J34.2 DEVIATED NASAL SEPTUM: ICD-10-CM

## 2024-05-14 PROCEDURE — 99203 OFFICE O/P NEW LOW 30 MIN: CPT

## 2024-05-14 NOTE — HISTORY OF PRESENT ILLNESS
[FreeTextEntry1] : 49M with no medical or surgical hx, presenting s/p mechanical fall on 49/2024. Patient reports he was walking outside and tripped on the sidewalk. Hit his head. Denies dizziness or feeling ill before fall. Reports nasal pain and difficulty breathing. Denies headache, vision or other sensory changes. No other complaints.

## 2024-05-15 VITALS — BODY MASS INDEX: 30.36 KG/M2 | HEIGHT: 69 IN | WEIGHT: 205 LBS

## 2024-11-23 NOTE — ED ADULT TRIAGE NOTE - MODE OF ARRIVAL
PLEASE READ YOUR DISCHARGE INSTRUCTIONS ENTIRELY AS IT CONTAINS IMPORTANT INFORMATION.    Please drink plenty of fluids.    Please get plenty of rest.    Please return here or go to the Emergency Department for any concerns or worsening of condition.    Please take an over the counter antihistamine medication (Allegra/Claritin/Zyrtec) of your choice as directed for allergy symptoms and/or runny nose and postnasal drip.    Try an over the counter decongestant for sinus pressure/ear pressure, congestion symptoms like Mucinex D or Sudafed or Phenylephrine. You buy this behind the pharmacy counter.    If you do have Hypertension or palpitations, it is safe to take Coricidin HBP for relief of sinus symptoms.    Tylenol or ibuprofen can also be used as directed for pain and fever unless you have an allergy to them or medical condition such as stomach ulcers, kidney or liver disease or blood thinners etc for which you should not be taking these type of medications.     Sore throat recommendations: Warm fluids, warm salt water gargles, throat lozenges, tea, honey, soup, rest, hydration.    Use over the counter Flonase or Nasocort: one spray each nostril twice daily OR two sprays each nostril once daily until nares dry out, unless you have Glaucoma.   Can also supplement with nasal saline rinse.    Sinus rinses DO NOT USE TAP WATER, if you must, water must be at a rolling boil for 1 minute, let it cool, then use.  May use distilled water, or over the counter nasal saline rinses.  Jaret's vapor rub in shower to help open nasal passages.  May use nasal gel to keep passages moisturized.  May use nasal saline sprays during the day for added relief of congestion.   For those who go to the gym, please do not use the sauna or steam room now to clear sinuses.    Cough     Rest and fluids are important  Can use honey with gabriella to soothe your throat    Robitussin or Delsyum for cough suppressant for dry cough.    Mucinex DM or  products containing Guaifenesin or Dextromethorphan for expectorant (wet cough).    Take prescription cough meds (pills) as prescribed; take prescription cough syrup at night as needed for cough.  Do not take both the prescribed cough pills and syrup at the same time or within 6 hours of each other.  Do not take the cough syrup with any other sedative medication as it can can cause drowsiness. Do not operate any heavy machinery, drink or drive while taking the cough syrup.     Please follow up with your primary care doctor or specialist in the next 48-72hrs as needed and if no improvement    If you smoke, please stop smoking.    Please return or see your primary care doctor if you develop new or worsening symptoms.     Lastly, good hand washing and cough hygiene (cough into your elbow) will help prevent the spread of the illness. A general rule is that you are no longer contagious once you have been without a fever for over 24 hours without requiring fever reducing medications.     Please arrange follow up with your primary medical clinic as soon as possible. You must understand that you've received an Urgent Care treatment only and that you may be released before all of your medical problems are known or treated. You, the patient, will arrange for follow up as instructed. If your symptoms worsen or fail to improve you should go to the Emergency Room.     Walk in [employer] intermittent/cramping